# Patient Record
Sex: FEMALE | Race: WHITE | URBAN - METROPOLITAN AREA
[De-identification: names, ages, dates, MRNs, and addresses within clinical notes are randomized per-mention and may not be internally consistent; named-entity substitution may affect disease eponyms.]

---

## 2020-05-18 ENCOUNTER — OFFICE VISIT (OUTPATIENT)
Dept: URBAN - METROPOLITAN AREA CLINIC 81 | Facility: CLINIC | Age: 65
End: 2020-05-18
Payer: OTHER GOVERNMENT

## 2020-05-18 DIAGNOSIS — H00.14 CHALAZION LEFT UPPER EYELID: Primary | ICD-10-CM

## 2020-05-18 PROCEDURE — 99212 OFFICE O/P EST SF 10 MIN: CPT | Performed by: OPTOMETRIST

## 2020-05-18 PROCEDURE — 99201 NEW PT PROB FOCUSED 10MI: CPT | Performed by: OPTOMETRIST

## 2020-05-18 RX ORDER — NEOMYCIN, POLYMYXIN B SULFATES, DEXAMETHASONE 1; 3.5; 1 MG/G; MG/G; [USP'U]/G
OINTMENT OPHTHALMIC
Qty: 1 | Refills: 1 | Status: ACTIVE
Start: 2020-05-18

## 2020-05-18 ASSESSMENT — INTRAOCULAR PRESSURE
OD: 11
OS: 13

## 2020-05-18 NOTE — IMPRESSION/PLAN
Impression: Chalazion left upper eyelid: H00.14. w/ small/early chalazion central LL - no pain reported Plan: Rx [employer] bryan TID - recommend hot compresses 3x daily w/ lid massage - Discussed etiology and probable time line for resolution. Pt not interested in excision/drainage at present time.  

RTC PRN

## 2023-05-05 ENCOUNTER — OFFICE VISIT (OUTPATIENT)
Dept: URBAN - METROPOLITAN AREA CLINIC 81 | Facility: CLINIC | Age: 68
End: 2023-05-05
Payer: OTHER GOVERNMENT

## 2023-05-05 DIAGNOSIS — H40.013 OPEN ANGLE WITH BORDERLINE FINDINGS, LOW RISK, BILATERAL: ICD-10-CM

## 2023-05-05 DIAGNOSIS — H52.4 PRESBYOPIA: ICD-10-CM

## 2023-05-05 DIAGNOSIS — H25.13 AGE-RELATED NUCLEAR CATARACT, BILATERAL: Primary | ICD-10-CM

## 2023-05-05 PROCEDURE — 99213 OFFICE O/P EST LOW 20 MIN: CPT | Performed by: OPTOMETRIST

## 2023-05-05 ASSESSMENT — INTRAOCULAR PRESSURE
OS: 10
OD: 10

## 2023-05-05 ASSESSMENT — VISUAL ACUITY
OD: 20/20
OS: 20/20

## 2023-05-05 NOTE — IMPRESSION/PLAN
Impression: Open angle with borderline findings, low risk, bilateral: H40.013.
-IOP low teens and stable OU
-positive family history Plan: The pathophysiology of glaucoma and the difference between being a glaucoma suspect and having glaucoma were discussed with patient. Monitor yearly.

## 2023-05-05 NOTE — IMPRESSION/PLAN
Impression: Presbyopia: H52.4. Plan: Discussed condition with patient. Dispensed glasses Rx to patient and instructed on normal adaptation period.

## 2024-05-09 ENCOUNTER — OFFICE VISIT (OUTPATIENT)
Dept: URBAN - METROPOLITAN AREA CLINIC 81 | Facility: CLINIC | Age: 69
End: 2024-05-09
Payer: MEDICARE

## 2024-05-09 DIAGNOSIS — H04.123 DRY EYE SYNDROME OF BILATERAL LACRIMAL GLANDS: ICD-10-CM

## 2024-05-09 DIAGNOSIS — H25.13 AGE-RELATED NUCLEAR CATARACT, BILATERAL: Primary | ICD-10-CM

## 2024-05-09 DIAGNOSIS — H43.813 VITREOUS DEGENERATION, BILATERAL: ICD-10-CM

## 2024-05-09 DIAGNOSIS — H40.013 OPEN ANGLE WITH BORDERLINE FINDINGS, LOW RISK, BILATERAL: ICD-10-CM

## 2024-05-09 PROCEDURE — 99213 OFFICE O/P EST LOW 20 MIN: CPT | Performed by: OPTOMETRIST

## 2024-05-09 PROCEDURE — 92133 CPTRZD OPH DX IMG PST SGM ON: CPT | Performed by: OPTOMETRIST

## 2024-05-09 ASSESSMENT — KERATOMETRY
OD: 46.25
OS: 44.13

## 2024-05-09 ASSESSMENT — INTRAOCULAR PRESSURE
OD: 10
OS: 11

## 2025-05-15 ENCOUNTER — OFFICE VISIT (OUTPATIENT)
Dept: URBAN - METROPOLITAN AREA CLINIC 81 | Facility: CLINIC | Age: 70
End: 2025-05-15
Payer: MEDICARE

## 2025-05-15 DIAGNOSIS — H40.013 OPEN ANGLE WITH BORDERLINE FINDINGS, LOW RISK, BILATERAL: ICD-10-CM

## 2025-05-15 DIAGNOSIS — H52.4 PRESBYOPIA: ICD-10-CM

## 2025-05-15 DIAGNOSIS — H25.13 AGE-RELATED NUCLEAR CATARACT, BILATERAL: Primary | ICD-10-CM

## 2025-05-15 DIAGNOSIS — H04.123 DRY EYE SYNDROME OF BILATERAL LACRIMAL GLANDS: ICD-10-CM

## 2025-05-15 PROCEDURE — 92015 DETERMINE REFRACTIVE STATE: CPT | Performed by: OPTOMETRIST

## 2025-05-15 PROCEDURE — 99213 OFFICE O/P EST LOW 20 MIN: CPT | Performed by: OPTOMETRIST

## 2025-05-15 PROCEDURE — 92133 CPTRZD OPH DX IMG PST SGM ON: CPT | Performed by: OPTOMETRIST

## 2025-05-15 ASSESSMENT — VISUAL ACUITY
OD: 20/20
OS: 20/20

## 2025-05-15 ASSESSMENT — INTRAOCULAR PRESSURE
OS: 12
OD: 13